# Patient Record
Sex: FEMALE | Race: WHITE
[De-identification: names, ages, dates, MRNs, and addresses within clinical notes are randomized per-mention and may not be internally consistent; named-entity substitution may affect disease eponyms.]

---

## 2019-11-15 ENCOUNTER — HOSPITAL ENCOUNTER (OUTPATIENT)
Dept: HOSPITAL 75 - RAD | Age: 29
End: 2019-11-15
Attending: OBSTETRICS & GYNECOLOGY
Payer: COMMERCIAL

## 2019-11-15 DIAGNOSIS — N93.9: ICD-10-CM

## 2019-11-15 DIAGNOSIS — N94.10: Primary | ICD-10-CM

## 2019-11-15 PROCEDURE — 76856 US EXAM PELVIC COMPLETE: CPT

## 2019-11-15 PROCEDURE — 76830 TRANSVAGINAL US NON-OB: CPT

## 2019-11-15 NOTE — DIAGNOSTIC IMAGING REPORT
PROCEDURE: US Non-ob pelvis comp/trans.



TECHNIQUE: Multiple realtime grayscale images were obtained of

the pelvis in various projections endovaginally.



Transabdominal imaging was also performed.



INDICATION: Dyspareunia.



FINDINGS: Uterus measures 7.4 x 5.8 x 3.7 cm. Endometrium is 3 mm

in thickness. No myometrial mass is detected. Right ovary

measures 4.3 x 3.0 x 2.4 cm and the left ovary measures 3.9 x 3.3

x 2.1 cm. There is blood flow to both ovaries. There are small

partially collapsed cysts involving the right ovary approximately

12 mm and 15 mm in size. No adnexal mass or free fluid is seen.



IMPRESSION: Essentially unremarkable pelvic ultrasound.



Dictated by: 



  Dictated on workstation # [id number]

## 2020-06-19 ENCOUNTER — HOSPITAL ENCOUNTER (OUTPATIENT)
Dept: HOSPITAL 75 - LAB FS | Age: 30
End: 2020-06-19
Attending: OBSTETRICS & GYNECOLOGY
Payer: COMMERCIAL

## 2020-06-19 DIAGNOSIS — R39.15: Primary | ICD-10-CM

## 2020-06-19 LAB
APTT PPP: YELLOW S
BACTERIA #/AREA URNS HPF: (no result) /HPF
BILIRUB UR QL STRIP: NEGATIVE
FIBRINOGEN PPP-MCNC: CLEAR MG/DL
GLUCOSE UR STRIP-MCNC: NEGATIVE MG/DL
KETONES UR QL STRIP: NEGATIVE
LEUKOCYTE ESTERASE UR QL STRIP: NEGATIVE
NITRITE UR QL STRIP: NEGATIVE
PH UR STRIP: 6.5 [PH] (ref 5–9)
PROT UR QL STRIP: NEGATIVE
RBC #/AREA URNS HPF: (no result) /HPF
SP GR UR STRIP: <=1.005 (ref 1.02–1.02)
SQUAMOUS #/AREA URNS HPF: (no result) /HPF
WBC #/AREA URNS HPF: (no result) /HPF

## 2020-06-19 PROCEDURE — 87077 CULTURE AEROBIC IDENTIFY: CPT

## 2020-06-19 PROCEDURE — 81000 URINALYSIS NONAUTO W/SCOPE: CPT

## 2020-06-19 PROCEDURE — 87088 URINE BACTERIA CULTURE: CPT

## 2020-07-24 ENCOUNTER — HOSPITAL ENCOUNTER (OUTPATIENT)
Dept: HOSPITAL 75 - RAD | Age: 30
End: 2020-07-24
Attending: OBSTETRICS & GYNECOLOGY
Payer: COMMERCIAL

## 2020-07-24 DIAGNOSIS — Z36.89: Primary | ICD-10-CM

## 2020-07-24 DIAGNOSIS — Z3A.21: ICD-10-CM

## 2020-07-24 PROCEDURE — 76805 OB US >/= 14 WKS SNGL FETUS: CPT

## 2020-07-24 NOTE — DIAGNOSTIC IMAGING REPORT
INDICATION: Fetal survey.



TECHNIQUE: Multiple real-time grayscale images were obtained over

the gravid uterus.



COMPARISON: None



FINDINGS: There is a single live fetus in breech presentation.

Fetal heart rate was recorded at 132 bpm. Placenta is anterior.

Amniotic fluid volume is normal. Cervical length is 3.7 cm. Fetal

survey demonstrates fetal kidneys, bladder and stomach to be

unremarkable. Fetal brain is unremarkable. There is a

four-chamber heart. There is a three-vessel cord with normal

insertion. Fetal spine is unremarkable.



Biometrical measurements are as follows:

Biparietal 4.80 cm, age 20 weeks 4 days.

Head circumference 19.54 cm, age 21 weeks 6 days.

Abdominal circumference 15.96 cm, age 21 weeks 1 days.

Femur length 3.52 cm, age 21 weeks 1 days.



Sonographic estimate age: 21 weeks 2 days.

Sonographic estimated date of delivery: 12/02/20.



Estimated Fetal Weight: 402 gm (+/- 59  gm).

LMP percentile: 60%.



Fetal heart rate: 132 beats per minute.



Fetal number: 1 of 1.



IMPRESSION: Single live IUP 21 weeks 2 days gestational age. The

estimated date of confinement sonographically is 12/02/2020.



Dictated by: 



  Dictated on workstation # [id number]

## 2020-09-30 ENCOUNTER — HOSPITAL ENCOUNTER (OUTPATIENT)
Dept: HOSPITAL 75 - LABNPT | Age: 30
End: 2020-09-30
Attending: OBSTETRICS & GYNECOLOGY
Payer: COMMERCIAL

## 2020-09-30 DIAGNOSIS — O13.9: Primary | ICD-10-CM

## 2020-09-30 LAB
CREAT UR-MCNC: 58 MG/DL (ref 30–125)
PROT UR-MCNC: < 6 MG/DL (ref 6–12)

## 2020-09-30 PROCEDURE — 82570 ASSAY OF URINE CREATININE: CPT

## 2020-09-30 PROCEDURE — 84156 ASSAY OF PROTEIN URINE: CPT

## 2020-11-05 ENCOUNTER — HOSPITAL ENCOUNTER (OUTPATIENT)
Dept: HOSPITAL 75 - LABNPT | Age: 30
End: 2020-11-05
Attending: OBSTETRICS & GYNECOLOGY
Payer: COMMERCIAL

## 2020-11-05 LAB
CREAT UR-MCNC: 53 MG/DL (ref 30–125)
PROT UR-MCNC: < 6 MG/DL (ref 6–12)

## 2020-11-05 PROCEDURE — 82570 ASSAY OF URINE CREATININE: CPT

## 2020-11-05 PROCEDURE — 84156 ASSAY OF PROTEIN URINE: CPT

## 2020-11-06 ENCOUNTER — HOSPITAL ENCOUNTER (OUTPATIENT)
Dept: HOSPITAL 75 - WSO | Age: 30
LOS: 1 days | Discharge: HOME | End: 2020-11-07
Attending: OBSTETRICS & GYNECOLOGY
Payer: COMMERCIAL

## 2020-11-06 VITALS — DIASTOLIC BLOOD PRESSURE: 83 MMHG | SYSTOLIC BLOOD PRESSURE: 148 MMHG

## 2020-11-06 VITALS — SYSTOLIC BLOOD PRESSURE: 161 MMHG | DIASTOLIC BLOOD PRESSURE: 95 MMHG

## 2020-11-06 VITALS — DIASTOLIC BLOOD PRESSURE: 91 MMHG | SYSTOLIC BLOOD PRESSURE: 164 MMHG

## 2020-11-06 VITALS — DIASTOLIC BLOOD PRESSURE: 83 MMHG | SYSTOLIC BLOOD PRESSURE: 139 MMHG

## 2020-11-06 VITALS — SYSTOLIC BLOOD PRESSURE: 174 MMHG | DIASTOLIC BLOOD PRESSURE: 98 MMHG

## 2020-11-06 VITALS — BODY MASS INDEX: 43.4 KG/M2 | HEIGHT: 69.02 IN | WEIGHT: 293 LBS

## 2020-11-06 DIAGNOSIS — Z34.90: Primary | ICD-10-CM

## 2020-11-06 DIAGNOSIS — Z3A.00: ICD-10-CM

## 2020-11-06 LAB
ALBUMIN SERPL-MCNC: 3.1 GM/DL (ref 3.2–4.5)
ALP SERPL-CCNC: 81 U/L (ref 40–136)
ALT SERPL-CCNC: 25 U/L (ref 0–55)
APTT PPP: YELLOW S
BACTERIA #/AREA URNS HPF: (no result) /HPF
BASOPHILS # BLD AUTO: 0 10^3/UL (ref 0–0.1)
BASOPHILS NFR BLD AUTO: 0 % (ref 0–10)
BILIRUB SERPL-MCNC: 0.3 MG/DL (ref 0.1–1)
BILIRUB UR QL STRIP: NEGATIVE
BUN/CREAT SERPL: 13
CALCIUM SERPL-MCNC: 8.6 MG/DL (ref 8.5–10.1)
CHLORIDE SERPL-SCNC: 108 MMOL/L (ref 98–107)
CO2 SERPL-SCNC: 19 MMOL/L (ref 21–32)
CREAT SERPL-MCNC: 0.72 MG/DL (ref 0.6–1.3)
EOSINOPHIL # BLD AUTO: 0 10^3/UL (ref 0–0.3)
EOSINOPHIL NFR BLD AUTO: 0 % (ref 0–10)
FIBRINOGEN PPP-MCNC: CLEAR MG/DL
GFR SERPLBLD BASED ON 1.73 SQ M-ARVRAT: > 60 ML/MIN
GLUCOSE SERPL-MCNC: 86 MG/DL (ref 70–105)
GLUCOSE UR STRIP-MCNC: NEGATIVE MG/DL
HCT VFR BLD CALC: 35 % (ref 35–52)
HGB BLD-MCNC: 11.9 G/DL (ref 11.5–16)
KETONES UR QL STRIP: NEGATIVE
LEUKOCYTE ESTERASE UR QL STRIP: NEGATIVE
LYMPHOCYTES # BLD AUTO: 1.9 10^3/UL (ref 1–4)
LYMPHOCYTES NFR BLD AUTO: 22 % (ref 12–44)
MANUAL DIFFERENTIAL PERFORMED BLD QL: NO
MCH RBC QN AUTO: 29 PG (ref 25–34)
MCHC RBC AUTO-ENTMCNC: 34 G/DL (ref 32–36)
MCV RBC AUTO: 86 FL (ref 80–99)
MONOCYTES # BLD AUTO: 0.6 10^3/UL (ref 0–1)
MONOCYTES NFR BLD AUTO: 7 % (ref 0–12)
NEUTROPHILS # BLD AUTO: 6 10^3/UL (ref 1.8–7.8)
NEUTROPHILS NFR BLD AUTO: 71 % (ref 42–75)
NITRITE UR QL STRIP: NEGATIVE
PH UR STRIP: 6 [PH] (ref 5–9)
PLATELET # BLD: 173 10^3/UL (ref 130–400)
PMV BLD AUTO: 11.6 FL (ref 9–12.2)
POTASSIUM SERPL-SCNC: 3.6 MMOL/L (ref 3.6–5)
PROT SERPL-MCNC: 6.1 GM/DL (ref 6.4–8.2)
PROT UR QL STRIP: NEGATIVE
RBC #/AREA URNS HPF: (no result) /HPF
SODIUM SERPL-SCNC: 138 MMOL/L (ref 135–145)
SP GR UR STRIP: 1.01 (ref 1.02–1.02)
SQUAMOUS #/AREA URNS HPF: (no result) /HPF
URATE SERPL-MCNC: 6.7 MG/DL (ref 2.6–7.2)
WBC # BLD AUTO: 8.5 10^3/UL (ref 4.3–11)
WBC #/AREA URNS HPF: (no result) /HPF

## 2020-11-06 PROCEDURE — 36415 COLL VENOUS BLD VENIPUNCTURE: CPT

## 2020-11-06 PROCEDURE — 82570 ASSAY OF URINE CREATININE: CPT

## 2020-11-06 PROCEDURE — 84550 ASSAY OF BLOOD/URIC ACID: CPT

## 2020-11-06 PROCEDURE — 99213 OFFICE O/P EST LOW 20 MIN: CPT

## 2020-11-06 PROCEDURE — 81000 URINALYSIS NONAUTO W/SCOPE: CPT

## 2020-11-06 PROCEDURE — 87088 URINE BACTERIA CULTURE: CPT

## 2020-11-06 PROCEDURE — 85025 COMPLETE CBC W/AUTO DIFF WBC: CPT

## 2020-11-06 PROCEDURE — 80053 COMPREHEN METABOLIC PANEL: CPT

## 2020-11-06 PROCEDURE — 84156 ASSAY OF PROTEIN URINE: CPT

## 2020-11-06 PROCEDURE — 83615 LACTATE (LD) (LDH) ENZYME: CPT

## 2020-11-06 NOTE — NUR
called unit, requesting bp be taken and dr updated. 

2207:  notified of 164/91 bp and pulse 64

plan for pt to stay all night, regular diet and lab work.

## 2020-11-06 NOTE — NUR
PREET ESCOBAR presented to unit via AMBULATORY from ED, accompanied by ADULT MALE, with c/o 
INCREASED BP. PREET ESCOBAR weighed, gowned, voided, and to bed.  EFHM and TOCO applied, VS 
taken.  PREET ESCOBAR oriented to bed controls, call light, TV, heat, and A/C controls. 
above  and further assessments carried out per CATRACHITO POMPA

## 2020-11-06 NOTE — NUR
Dr. Velasquez called about pt. Informed that  pt presents because of elevated blood 
pressures. Home and current vitals and B/P's reviewed. Informed that pt has no other 
symptoms of elevated B/P's. Informed that pt was seen in the office yesterday and Dr. Langston 
ran pre-eclamptic panal and urine, and pt states that "nurse called and said that all labs 
were good." Dr. Langston placed pt on labetalol 200mg TID. Dr. Velasquez orders pt to have evening 
dose of Labetalol and to check B/P approx 30 minutes after dose. Continue to watch pt at 
this time.

## 2020-11-07 VITALS — DIASTOLIC BLOOD PRESSURE: 89 MMHG | SYSTOLIC BLOOD PRESSURE: 132 MMHG

## 2020-11-07 VITALS — SYSTOLIC BLOOD PRESSURE: 174 MMHG | DIASTOLIC BLOOD PRESSURE: 105 MMHG

## 2020-11-07 VITALS — SYSTOLIC BLOOD PRESSURE: 135 MMHG | DIASTOLIC BLOOD PRESSURE: 72 MMHG

## 2020-11-07 VITALS — DIASTOLIC BLOOD PRESSURE: 104 MMHG | SYSTOLIC BLOOD PRESSURE: 166 MMHG

## 2020-11-07 VITALS — SYSTOLIC BLOOD PRESSURE: 113 MMHG | DIASTOLIC BLOOD PRESSURE: 75 MMHG

## 2020-11-07 VITALS — SYSTOLIC BLOOD PRESSURE: 147 MMHG | DIASTOLIC BLOOD PRESSURE: 87 MMHG

## 2020-11-07 VITALS — SYSTOLIC BLOOD PRESSURE: 121 MMHG | DIASTOLIC BLOOD PRESSURE: 69 MMHG

## 2020-11-07 VITALS — DIASTOLIC BLOOD PRESSURE: 88 MMHG | SYSTOLIC BLOOD PRESSURE: 130 MMHG

## 2020-11-07 VITALS — DIASTOLIC BLOOD PRESSURE: 105 MMHG | SYSTOLIC BLOOD PRESSURE: 174 MMHG

## 2020-11-07 LAB
CREAT UR-MCNC: 191 MG/DL (ref 30–125)
PROT UR-MCNC: 18 MG/DL (ref 6–12)

## 2020-11-07 NOTE — NUR
Discharge instructions explained, signed and copy to patient.  pt verbalized understanding 
of instructions and questions answered.  Pt up to get dressed.

## 2020-11-07 NOTE — NUR
Dr. Velasquez called with updated B/P's throughout the night and protein creatinine ratio.  
states that she will contact Dr. Langston for further management and that pt. needs to start 
labetalol 200mg BID while she is here.

## 2020-11-07 NOTE — NUR
Dr. Velasquez called and states that pt can be discharged on bedrest. Pt. should keep her 
appointment with Dr. Langston this week and continue medications.

## 2020-11-09 NOTE — PHYSICIAN QUERY-FINAL DX
Clinic Account Progress/Dx


Physician Query:


Please give diagnosis





Please include # weeks gestation


Date of Service





Nov 6, 2020 at 19:36











NEENA STERLING                     Nov 9, 2020 09:19

## 2020-11-15 ENCOUNTER — HOSPITAL ENCOUNTER (INPATIENT)
Dept: HOSPITAL 75 - LDRP | Age: 30
LOS: 3 days | Discharge: HOME | End: 2020-11-18
Attending: OBSTETRICS & GYNECOLOGY | Admitting: OBSTETRICS & GYNECOLOGY
Payer: COMMERCIAL

## 2020-11-15 VITALS — BODY MASS INDEX: 43.4 KG/M2 | HEIGHT: 69.02 IN | WEIGHT: 293 LBS

## 2020-11-15 VITALS — SYSTOLIC BLOOD PRESSURE: 162 MMHG | DIASTOLIC BLOOD PRESSURE: 101 MMHG

## 2020-11-15 VITALS — DIASTOLIC BLOOD PRESSURE: 128 MMHG | SYSTOLIC BLOOD PRESSURE: 182 MMHG

## 2020-11-15 VITALS — SYSTOLIC BLOOD PRESSURE: 160 MMHG | DIASTOLIC BLOOD PRESSURE: 93 MMHG

## 2020-11-15 VITALS — SYSTOLIC BLOOD PRESSURE: 152 MMHG | DIASTOLIC BLOOD PRESSURE: 101 MMHG

## 2020-11-15 VITALS — DIASTOLIC BLOOD PRESSURE: 101 MMHG | SYSTOLIC BLOOD PRESSURE: 173 MMHG

## 2020-11-15 VITALS — SYSTOLIC BLOOD PRESSURE: 155 MMHG | DIASTOLIC BLOOD PRESSURE: 102 MMHG

## 2020-11-15 VITALS — SYSTOLIC BLOOD PRESSURE: 182 MMHG | DIASTOLIC BLOOD PRESSURE: 116 MMHG

## 2020-11-15 VITALS — SYSTOLIC BLOOD PRESSURE: 177 MMHG | DIASTOLIC BLOOD PRESSURE: 109 MMHG

## 2020-11-15 DIAGNOSIS — Z20.828: ICD-10-CM

## 2020-11-15 DIAGNOSIS — D62: ICD-10-CM

## 2020-11-15 DIAGNOSIS — Z3A.37: ICD-10-CM

## 2020-11-15 LAB
ALBUMIN SERPL-MCNC: 3.3 GM/DL (ref 3.2–4.5)
ALP SERPL-CCNC: 94 U/L (ref 40–136)
ALT SERPL-CCNC: 20 U/L (ref 0–55)
APTT PPP: YELLOW S
BACTERIA #/AREA URNS HPF: (no result) /HPF
BASOPHILS # BLD AUTO: 0 10^3/UL (ref 0–0.1)
BASOPHILS NFR BLD AUTO: 0 % (ref 0–10)
BILIRUB SERPL-MCNC: 0.3 MG/DL (ref 0.1–1)
BILIRUB UR QL STRIP: NEGATIVE
BUN/CREAT SERPL: 13
CALCIUM SERPL-MCNC: 8.5 MG/DL (ref 8.5–10.1)
CHLORIDE SERPL-SCNC: 107 MMOL/L (ref 98–107)
CO2 SERPL-SCNC: 18 MMOL/L (ref 21–32)
CREAT SERPL-MCNC: 0.85 MG/DL (ref 0.6–1.3)
CREAT UR-MCNC: 220 MG/DL (ref 30–125)
EOSINOPHIL # BLD AUTO: 0 10^3/UL (ref 0–0.3)
EOSINOPHIL NFR BLD AUTO: 0 % (ref 0–10)
FIBRINOGEN PPP-MCNC: CLEAR MG/DL
GFR SERPLBLD BASED ON 1.73 SQ M-ARVRAT: > 60 ML/MIN
GLUCOSE SERPL-MCNC: 114 MG/DL (ref 70–105)
GLUCOSE UR STRIP-MCNC: NEGATIVE MG/DL
HCT VFR BLD CALC: 37 % (ref 35–52)
HGB BLD-MCNC: 12.8 G/DL (ref 11.5–16)
KETONES UR QL STRIP: NEGATIVE
LEUKOCYTE ESTERASE UR QL STRIP: NEGATIVE
LYMPHOCYTES # BLD AUTO: 1.6 10^3/UL (ref 1–4)
LYMPHOCYTES NFR BLD AUTO: 18 % (ref 12–44)
MANUAL DIFFERENTIAL PERFORMED BLD QL: NO
MCH RBC QN AUTO: 29 PG (ref 25–34)
MCHC RBC AUTO-ENTMCNC: 34 G/DL (ref 32–36)
MCV RBC AUTO: 85 FL (ref 80–99)
MONOCYTES # BLD AUTO: 0.6 10^3/UL (ref 0–1)
MONOCYTES NFR BLD AUTO: 6 % (ref 0–12)
NEUTROPHILS # BLD AUTO: 7 10^3/UL (ref 1.8–7.8)
NEUTROPHILS NFR BLD AUTO: 76 % (ref 42–75)
NITRITE UR QL STRIP: NEGATIVE
PH UR STRIP: 6 [PH] (ref 5–9)
PLATELET # BLD: 191 10^3/UL (ref 130–400)
PMV BLD AUTO: 12 FL (ref 9–12.2)
POTASSIUM SERPL-SCNC: 3.5 MMOL/L (ref 3.6–5)
PROT SERPL-MCNC: 6.6 GM/DL (ref 6.4–8.2)
PROT UR QL STRIP: (no result)
PROT UR-MCNC: 145 MG/DL (ref 6–12)
RBC #/AREA URNS HPF: (no result) /HPF
SODIUM SERPL-SCNC: 138 MMOL/L (ref 135–145)
SP GR UR STRIP: >=1.03 (ref 1.02–1.02)
SQUAMOUS #/AREA URNS HPF: (no result) /HPF
URATE SERPL-MCNC: 6.8 MG/DL (ref 2.6–7.2)
WBC # BLD AUTO: 9.3 10^3/UL (ref 4.3–11)
WBC #/AREA URNS HPF: (no result) /HPF

## 2020-11-15 PROCEDURE — 80053 COMPREHEN METABOLIC PANEL: CPT

## 2020-11-15 PROCEDURE — 86900 BLOOD TYPING SEROLOGIC ABO: CPT

## 2020-11-15 PROCEDURE — 82570 ASSAY OF URINE CREATININE: CPT

## 2020-11-15 PROCEDURE — 84156 ASSAY OF PROTEIN URINE: CPT

## 2020-11-15 PROCEDURE — 86901 BLOOD TYPING SEROLOGIC RH(D): CPT

## 2020-11-15 PROCEDURE — 87635 SARS-COV-2 COVID-19 AMP PRB: CPT

## 2020-11-15 PROCEDURE — 84550 ASSAY OF BLOOD/URIC ACID: CPT

## 2020-11-15 PROCEDURE — 36415 COLL VENOUS BLD VENIPUNCTURE: CPT

## 2020-11-15 PROCEDURE — 3E0DXGC INTRODUCTION OF OTHER THERAPEUTIC SUBSTANCE INTO MOUTH AND PHARYNX, EXTERNAL APPROACH: ICD-10-PCS | Performed by: OBSTETRICS & GYNECOLOGY

## 2020-11-15 PROCEDURE — 85025 COMPLETE CBC W/AUTO DIFF WBC: CPT

## 2020-11-15 PROCEDURE — 87088 URINE BACTERIA CULTURE: CPT

## 2020-11-15 PROCEDURE — 86850 RBC ANTIBODY SCREEN: CPT

## 2020-11-15 PROCEDURE — 81000 URINALYSIS NONAUTO W/SCOPE: CPT

## 2020-11-15 PROCEDURE — 83735 ASSAY OF MAGNESIUM: CPT

## 2020-11-15 RX ADMIN — LABETALOL HCL SCH MG: 200 TABLET, FILM COATED ORAL at 20:09

## 2020-11-15 RX ADMIN — SODIUM CHLORIDE, SODIUM LACTATE, POTASSIUM CHLORIDE, CALCIUM CHLORIDE, AND DEXTROSE MONOHYDRATE SCH MLS/HR: 600; 310; 30; 20; 5 INJECTION, SOLUTION INTRAVENOUS at 20:23

## 2020-11-15 NOTE — NUR
PREET ESCOBAR presented to unit via ambulation from ED, accompanied by ,for scheduled 
IOL. Pt. weighed, gowned, voided, and to bed.  EFHM and TOCO applied, VS taken.  Pt. 
oriented to bed controls, call light, TV, heat, and A/C controls.

## 2020-11-16 VITALS — SYSTOLIC BLOOD PRESSURE: 159 MMHG | DIASTOLIC BLOOD PRESSURE: 104 MMHG

## 2020-11-16 VITALS — SYSTOLIC BLOOD PRESSURE: 188 MMHG | DIASTOLIC BLOOD PRESSURE: 112 MMHG

## 2020-11-16 VITALS — DIASTOLIC BLOOD PRESSURE: 103 MMHG | SYSTOLIC BLOOD PRESSURE: 165 MMHG

## 2020-11-16 VITALS — DIASTOLIC BLOOD PRESSURE: 105 MMHG | SYSTOLIC BLOOD PRESSURE: 174 MMHG

## 2020-11-16 VITALS — DIASTOLIC BLOOD PRESSURE: 83 MMHG | SYSTOLIC BLOOD PRESSURE: 133 MMHG

## 2020-11-16 VITALS — SYSTOLIC BLOOD PRESSURE: 156 MMHG | DIASTOLIC BLOOD PRESSURE: 100 MMHG

## 2020-11-16 VITALS — SYSTOLIC BLOOD PRESSURE: 163 MMHG | DIASTOLIC BLOOD PRESSURE: 100 MMHG

## 2020-11-16 VITALS — SYSTOLIC BLOOD PRESSURE: 192 MMHG | DIASTOLIC BLOOD PRESSURE: 106 MMHG

## 2020-11-16 VITALS — SYSTOLIC BLOOD PRESSURE: 135 MMHG | DIASTOLIC BLOOD PRESSURE: 92 MMHG

## 2020-11-16 VITALS — DIASTOLIC BLOOD PRESSURE: 92 MMHG | SYSTOLIC BLOOD PRESSURE: 144 MMHG

## 2020-11-16 VITALS — SYSTOLIC BLOOD PRESSURE: 174 MMHG | DIASTOLIC BLOOD PRESSURE: 104 MMHG

## 2020-11-16 VITALS — SYSTOLIC BLOOD PRESSURE: 139 MMHG | DIASTOLIC BLOOD PRESSURE: 95 MMHG

## 2020-11-16 VITALS — SYSTOLIC BLOOD PRESSURE: 127 MMHG | DIASTOLIC BLOOD PRESSURE: 81 MMHG

## 2020-11-16 VITALS — DIASTOLIC BLOOD PRESSURE: 75 MMHG | SYSTOLIC BLOOD PRESSURE: 152 MMHG

## 2020-11-16 VITALS — DIASTOLIC BLOOD PRESSURE: 101 MMHG | SYSTOLIC BLOOD PRESSURE: 138 MMHG

## 2020-11-16 VITALS — DIASTOLIC BLOOD PRESSURE: 90 MMHG | SYSTOLIC BLOOD PRESSURE: 144 MMHG

## 2020-11-16 VITALS — DIASTOLIC BLOOD PRESSURE: 57 MMHG | SYSTOLIC BLOOD PRESSURE: 117 MMHG

## 2020-11-16 VITALS — SYSTOLIC BLOOD PRESSURE: 140 MMHG | DIASTOLIC BLOOD PRESSURE: 84 MMHG

## 2020-11-16 VITALS — SYSTOLIC BLOOD PRESSURE: 181 MMHG | DIASTOLIC BLOOD PRESSURE: 87 MMHG

## 2020-11-16 VITALS — DIASTOLIC BLOOD PRESSURE: 108 MMHG | SYSTOLIC BLOOD PRESSURE: 176 MMHG

## 2020-11-16 VITALS — DIASTOLIC BLOOD PRESSURE: 107 MMHG | SYSTOLIC BLOOD PRESSURE: 188 MMHG

## 2020-11-16 VITALS — SYSTOLIC BLOOD PRESSURE: 143 MMHG | DIASTOLIC BLOOD PRESSURE: 84 MMHG

## 2020-11-16 VITALS — SYSTOLIC BLOOD PRESSURE: 168 MMHG | DIASTOLIC BLOOD PRESSURE: 106 MMHG

## 2020-11-16 VITALS — SYSTOLIC BLOOD PRESSURE: 165 MMHG | DIASTOLIC BLOOD PRESSURE: 115 MMHG

## 2020-11-16 VITALS — DIASTOLIC BLOOD PRESSURE: 100 MMHG | SYSTOLIC BLOOD PRESSURE: 149 MMHG

## 2020-11-16 VITALS — SYSTOLIC BLOOD PRESSURE: 165 MMHG | DIASTOLIC BLOOD PRESSURE: 114 MMHG

## 2020-11-16 VITALS — SYSTOLIC BLOOD PRESSURE: 160 MMHG | DIASTOLIC BLOOD PRESSURE: 106 MMHG

## 2020-11-16 VITALS — SYSTOLIC BLOOD PRESSURE: 153 MMHG | DIASTOLIC BLOOD PRESSURE: 106 MMHG

## 2020-11-16 VITALS — DIASTOLIC BLOOD PRESSURE: 95 MMHG | SYSTOLIC BLOOD PRESSURE: 158 MMHG

## 2020-11-16 VITALS — DIASTOLIC BLOOD PRESSURE: 111 MMHG | SYSTOLIC BLOOD PRESSURE: 169 MMHG

## 2020-11-16 VITALS — DIASTOLIC BLOOD PRESSURE: 98 MMHG | SYSTOLIC BLOOD PRESSURE: 144 MMHG

## 2020-11-16 VITALS — SYSTOLIC BLOOD PRESSURE: 146 MMHG | DIASTOLIC BLOOD PRESSURE: 84 MMHG

## 2020-11-16 VITALS — SYSTOLIC BLOOD PRESSURE: 169 MMHG | DIASTOLIC BLOOD PRESSURE: 111 MMHG

## 2020-11-16 VITALS — DIASTOLIC BLOOD PRESSURE: 95 MMHG | SYSTOLIC BLOOD PRESSURE: 169 MMHG

## 2020-11-16 VITALS — DIASTOLIC BLOOD PRESSURE: 73 MMHG | SYSTOLIC BLOOD PRESSURE: 122 MMHG

## 2020-11-16 VITALS — DIASTOLIC BLOOD PRESSURE: 93 MMHG | SYSTOLIC BLOOD PRESSURE: 142 MMHG

## 2020-11-16 VITALS — DIASTOLIC BLOOD PRESSURE: 101 MMHG | SYSTOLIC BLOOD PRESSURE: 186 MMHG

## 2020-11-16 VITALS — DIASTOLIC BLOOD PRESSURE: 83 MMHG | SYSTOLIC BLOOD PRESSURE: 134 MMHG

## 2020-11-16 VITALS — DIASTOLIC BLOOD PRESSURE: 104 MMHG | SYSTOLIC BLOOD PRESSURE: 153 MMHG

## 2020-11-16 VITALS — SYSTOLIC BLOOD PRESSURE: 227 MMHG | DIASTOLIC BLOOD PRESSURE: 112 MMHG

## 2020-11-16 VITALS — SYSTOLIC BLOOD PRESSURE: 144 MMHG | DIASTOLIC BLOOD PRESSURE: 87 MMHG

## 2020-11-16 VITALS — DIASTOLIC BLOOD PRESSURE: 102 MMHG | SYSTOLIC BLOOD PRESSURE: 158 MMHG

## 2020-11-16 VITALS — SYSTOLIC BLOOD PRESSURE: 159 MMHG | DIASTOLIC BLOOD PRESSURE: 91 MMHG

## 2020-11-16 VITALS — DIASTOLIC BLOOD PRESSURE: 99 MMHG | SYSTOLIC BLOOD PRESSURE: 156 MMHG

## 2020-11-16 VITALS — SYSTOLIC BLOOD PRESSURE: 132 MMHG | DIASTOLIC BLOOD PRESSURE: 91 MMHG

## 2020-11-16 VITALS — DIASTOLIC BLOOD PRESSURE: 81 MMHG | SYSTOLIC BLOOD PRESSURE: 128 MMHG

## 2020-11-16 VITALS — SYSTOLIC BLOOD PRESSURE: 176 MMHG | DIASTOLIC BLOOD PRESSURE: 108 MMHG

## 2020-11-16 VITALS — DIASTOLIC BLOOD PRESSURE: 95 MMHG | SYSTOLIC BLOOD PRESSURE: 179 MMHG

## 2020-11-16 VITALS — DIASTOLIC BLOOD PRESSURE: 107 MMHG | SYSTOLIC BLOOD PRESSURE: 168 MMHG

## 2020-11-16 VITALS — SYSTOLIC BLOOD PRESSURE: 178 MMHG | DIASTOLIC BLOOD PRESSURE: 114 MMHG

## 2020-11-16 VITALS — SYSTOLIC BLOOD PRESSURE: 148 MMHG | DIASTOLIC BLOOD PRESSURE: 95 MMHG

## 2020-11-16 VITALS — SYSTOLIC BLOOD PRESSURE: 143 MMHG | DIASTOLIC BLOOD PRESSURE: 87 MMHG

## 2020-11-16 VITALS — DIASTOLIC BLOOD PRESSURE: 99 MMHG | SYSTOLIC BLOOD PRESSURE: 147 MMHG

## 2020-11-16 VITALS — SYSTOLIC BLOOD PRESSURE: 164 MMHG | DIASTOLIC BLOOD PRESSURE: 89 MMHG

## 2020-11-16 VITALS — SYSTOLIC BLOOD PRESSURE: 129 MMHG | DIASTOLIC BLOOD PRESSURE: 94 MMHG

## 2020-11-16 VITALS — SYSTOLIC BLOOD PRESSURE: 168 MMHG | DIASTOLIC BLOOD PRESSURE: 107 MMHG

## 2020-11-16 VITALS — DIASTOLIC BLOOD PRESSURE: 79 MMHG | SYSTOLIC BLOOD PRESSURE: 143 MMHG

## 2020-11-16 VITALS — DIASTOLIC BLOOD PRESSURE: 89 MMHG | SYSTOLIC BLOOD PRESSURE: 136 MMHG

## 2020-11-16 VITALS — SYSTOLIC BLOOD PRESSURE: 150 MMHG | DIASTOLIC BLOOD PRESSURE: 92 MMHG

## 2020-11-16 VITALS — DIASTOLIC BLOOD PRESSURE: 96 MMHG | SYSTOLIC BLOOD PRESSURE: 182 MMHG

## 2020-11-16 RX ADMIN — Medication SCH ML: at 21:17

## 2020-11-16 RX ADMIN — MISOPROSTOL SCH MCG: 100 TABLET ORAL at 19:23

## 2020-11-16 RX ADMIN — SODIUM CHLORIDE, SODIUM LACTATE, POTASSIUM CHLORIDE, CALCIUM CHLORIDE, AND DEXTROSE MONOHYDRATE SCH MLS/HR: 600; 310; 30; 20; 5 INJECTION, SOLUTION INTRAVENOUS at 04:19

## 2020-11-16 RX ADMIN — MAGNESIUM SULFATE IN WATER SCH MLS/HR: 40 INJECTION, SOLUTION INTRAVENOUS at 18:56

## 2020-11-16 RX ADMIN — WATER SCH MLS/HR: 1 INJECTION INTRAMUSCULAR; INTRAVENOUS; SUBCUTANEOUS at 04:18

## 2020-11-16 RX ADMIN — WATER SCH MLS/HR: 1 INJECTION INTRAMUSCULAR; INTRAVENOUS; SUBCUTANEOUS at 08:38

## 2020-11-16 RX ADMIN — LABETALOL HCL SCH MG: 200 TABLET, FILM COATED ORAL at 06:38

## 2020-11-16 RX ADMIN — MISOPROSTOL SCH MCG: 100 TABLET ORAL at 00:03

## 2020-11-16 RX ADMIN — SODIUM CHLORIDE, SODIUM LACTATE, POTASSIUM CHLORIDE, CALCIUM CHLORIDE, AND DEXTROSE MONOHYDRATE SCH MLS/HR: 600; 310; 30; 20; 5 INJECTION, SOLUTION INTRAVENOUS at 20:29

## 2020-11-16 RX ADMIN — SODIUM CHLORIDE, SODIUM LACTATE, POTASSIUM CHLORIDE, CALCIUM CHLORIDE, AND DEXTROSE MONOHYDRATE SCH MLS/HR: 600; 310; 30; 20; 5 INJECTION, SOLUTION INTRAVENOUS at 20:28

## 2020-11-16 RX ADMIN — MAGNESIUM SULFATE IN WATER SCH MLS/HR: 40 INJECTION, SOLUTION INTRAVENOUS at 00:13

## 2020-11-16 RX ADMIN — LABETALOL HCL SCH MG: 200 TABLET, FILM COATED ORAL at 13:27

## 2020-11-16 RX ADMIN — MAGNESIUM SULFATE IN WATER SCH MLS/HR: 40 INJECTION, SOLUTION INTRAVENOUS at 09:15

## 2020-11-16 RX ADMIN — LABETALOL HCL SCH MG: 200 TABLET, FILM COATED ORAL at 21:10

## 2020-11-16 RX ADMIN — ONDANSETRON PRN MG: 2 INJECTION, SOLUTION INTRAMUSCULAR; INTRAVENOUS at 13:03

## 2020-11-16 RX ADMIN — IBUPROFEN SCH MG: 600 TABLET ORAL at 20:12

## 2020-11-16 RX ADMIN — DOCUSATE SODIUM SCH MG: 100 CAPSULE ORAL at 20:12

## 2020-11-16 RX ADMIN — LABETALOL HCL SCH MG: 200 TABLET, FILM COATED ORAL at 19:43

## 2020-11-16 RX ADMIN — ONDANSETRON PRN MG: 2 INJECTION, SOLUTION INTRAMUSCULAR; INTRAVENOUS at 08:36

## 2020-11-16 RX ADMIN — SODIUM CHLORIDE, SODIUM LACTATE, POTASSIUM CHLORIDE, CALCIUM CHLORIDE, AND DEXTROSE MONOHYDRATE SCH MLS/HR: 600; 310; 30; 20; 5 INJECTION, SOLUTION INTRAVENOUS at 18:56

## 2020-11-16 RX ADMIN — Medication SCH ML: at 15:31

## 2020-11-16 NOTE — HISTORY & PHYSICAL-OB
OB - Chief Complaint & HPI


Date/Time


Date of Admission:


Date of Admission:  Nov 15, 2020 at 18:29


Date seen by a Provider:  2020


Time Seen by a Provider:  10:25





Chief Complaint/History


OB-Reason for Admission/Chief:  Induction of Labor


Hx :  1


Hx Para:  0


Expected Date of Delivery:  Dec 5, 2020


Gestational Age in Weeks:  37


Gestational Age in Days:  2


Indication for induction:  medical complication


Other reason for admission:


Preeclampsia


Admission Nurse Assessment Rev:  Yes


History of Labs


GBS pos


B neg





Allergies and Home Medications


Allergies


Coded Allergies:  


     No Known Drug Allergies (Unverified , 20)





Home Medications


Labetalol HCl 200 Mg Tablet, 200 MG PO TID, (Reported)


Levothyroxine Sodium 25 Mcg Tablet, 25 MCG PO DAILY, (Reported)





Patient Home Medication List


Home Medication List Reviewed:  Yes





OB - History


Hx of Present Pregnancy


Prenatal Care:  Yes


Ultrasounds:  Normal mid trimester US


Obstetrical Complications:  Pre-eclampsia


Medical Complications:  None





Delivery History


Adverse Rxn to Tranfusion:  No





Patient Past Medical History





n/a





Social History/Family History


Recent Infectious Disease Expo:  No


Alcohol Use:  Denies Use


Recreational Drug Use:  Yes


2nd Hand Smoke Exposure:  No





OB - Admission Exam


Physical Exam


Vitals:





Vital Signs








 20





 02:35 05:35 07:05


 


Temp  36.5 


 


Pulse   85


 


Resp   18


 


B/P (MAP)   165/114 (131)


 


Pulse Ox   98


 


O2 Delivery   Room Air


 


O2 Flow Rate 15.00  








HEENT:  NCAT


Heart:  Rhythm Normal


Lungs:  Clear


Abdomen:  Gravid


Extremities:  Normal


Reflexes:  Normal


Cervical Dilatation:  2cm


Effacement:  75%


Station:  -1


Membranes:  Intact


Fetal Heart Rate:  130's


Accelerations:  Accelerations Present


Decelerations:  No Decelerations


Short Term Variability:  Present


Long Term Variability:  Average (6-25)


Contractions on Admission:  6-10 Minutes Apart


Intensity:  Mild


Labs





Laboratory Tests








Test


 11/15/20


19:30 11/15/20


20:10 Range/Units


 


 


White Blood Count


 9.3 


 


 4.3-11.0


10^3/uL


 


Red Blood Count


 4.39 


 


 3.80-5.11


10^6/uL


 


Hemoglobin 12.8   11.5-16.0  g/dL


 


Hematocrit 37   35-52  %


 


Mean Corpuscular Volume 85   80-99  fL


 


Mean Corpuscular Hemoglobin 29   25-34  pg


 


Mean Corpuscular Hemoglobin


Concent 34 


 


 32-36  g/dL





 


Red Cell Distribution Width 13.7   10.0-14.5  %


 


Platelet Count


 191 


 


 130-400


10^3/uL


 


Mean Platelet Volume 12.0   9.0-12.2  fL


 


Immature Granulocyte % (Auto) 0    %


 


Neutrophils (%) (Auto) 76 H  42-75  %


 


Lymphocytes (%) (Auto) 18   12-44  %


 


Monocytes (%) (Auto) 6   0-12  %


 


Eosinophils (%) (Auto) 0   0-10  %


 


Basophils (%) (Auto) 0   0-10  %


 


Neutrophils # (Auto)


 7.0 


 


 1.8-7.8


10^3/uL


 


Lymphocytes # (Auto)


 1.6 


 


 1.0-4.0


10^3/uL


 


Monocytes # (Auto)


 0.6 


 


 0.0-1.0


10^3/uL


 


Eosinophils # (Auto)


 0.0 


 


 0.0-0.3


10^3/uL


 


Basophils # (Auto)


 0.0 


 


 0.0-0.1


10^3/uL


 


Immature Granulocyte # (Auto)


 0.0 


 


 0.0-0.1


10^3/uL


 


Urine Color YELLOW    


 


Urine Clarity CLEAR    


 


Urine pH 6.0   5-9  


 


Urine Specific Gravity >=1.030   1.016-1.022  


 


Urine Protein 145 H  6-12  MG/DL


 


Urine Glucose (UA) NEGATIVE   NEGATIVE  


 


Urine Ketones NEGATIVE   NEGATIVE  


 


Urine Nitrite NEGATIVE   NEGATIVE  


 


Urine Bilirubin NEGATIVE   NEGATIVE  


 


Urine Urobilinogen 0.2   < = 1.0  MG/DL


 


Urine Leukocyte Esterase NEGATIVE   NEGATIVE  


 


Urine RBC (Auto) TRACE-L   NEGATIVE  


 


Urine RBC RARE    /HPF


 


Urine WBC 0-2    /HPF


 


Urine Squamous Epithelial


Cells 5-10 


 


  /HPF





 


Urine Crystals NONE    /LPF


 


Urine Bacteria MODERATE H   /HPF


 


Urine Casts NONE    /LPF


 


Urine Mucus SMALL H   /LPF


 


Urine Culture Indicated YES    


 


Urine Creatinine 220 H    MG/DL


 


Urine Protein/Creatinine Ratio 0.66    


 


Sodium Level 138   135-145  MMOL/L


 


Potassium Level 3.5 L  3.6-5.0  MMOL/L


 


Chloride Level 107     MMOL/L


 


Carbon Dioxide Level 18 L  21-32  MMOL/L


 


Anion Gap 13   5-14  MMOL/L


 


Blood Urea Nitrogen 11   7-18  MG/DL


 


Creatinine


 0.85 


 


 0.60-1.30


MG/DL


 


Estimat Glomerular Filtration


Rate > 60 


 


  





 


BUN/Creatinine Ratio 13    


 


Glucose Level 114 H    MG/DL


 


Uric Acid 6.8   2.6-7.2  MG/DL


 


Calcium Level 8.5   8.5-10.1  MG/DL


 


Corrected Calcium 9.1   8.5-10.1  MG/DL


 


Magnesium Level 1.7   1.6-2.4  MG/DL


 


Total Bilirubin 0.3   0.1-1.0  MG/DL


 


Aspartate Amino Transf


(AST/SGOT) 21 


 


 5-34  U/L





 


Alanine Aminotransferase


(ALT/SGPT) 20 


 


 0-55  U/L





 


Alkaline Phosphatase 94     U/L


 


Total Protein 6.6   6.4-8.2  GM/DL


 


Albumin 3.3   3.2-4.5  GM/DL











OB - Assessment/Plan/Diagnosis


Assessment


Assessment:  induction of labor


Admission Dx


29 yo  @ 37.2 weeks


Preeclampsia


GBS pos


Admission Status:  Inpatient Order (span 2 midnights)


Reason for Inpatient Admission:  


Induction of labor at term





Plan


Plan:  Induction


Induction Method:  per Misoprostol Protocol











KIRILL CUELLAR DO            2020 10:26

## 2020-11-16 NOTE — NUR
FFu/0.  lt rubra noted, no clots expressed. megan-care offered. v-pad and panties in place.

pt transferred to room 309 via w/c with this RN and  @ side.  call light within 
reach.  familiarized with room surroundings.  will cont to monitor.

## 2020-11-16 NOTE — NUR
FFu/0.  moderate rubra noted.  megan-care offered.  reports HA and nausea better.  infant and 
 remains @ side.  will cont to monitor.

## 2020-11-16 NOTE — OB LABOR & DELIVERY RECORD
L&D History


Date of Service


Date of Service:  2020





History


Expected Date of Delivery:  Dec 5, 2020


Gestational Age in Weeks:  37


Hx :  1


Hx Para:  0





Pregnancy Complications


Prenatal Events:  Pre-Eclampsia, Routine Prenatal care


Operative Indications (Cesarea:  N/A-Vaginal Delivery


Intrapartal Events:  None





L&D Stage1


Monitors and Tracing


Fetal Monitor Mode:  Internal


Fetal Heart Rate:  120


Fetal Monitor Accelerations:  Uniform


Fetal Monitor Decelerations:  Variable


Fetal Station:  -2


Long Term Variability:  Average (6-10)


Short Term Variability:  Present


Fetal Presentation:  Vertex


Vital Signs





                          VS - Last 72 Hours, by Label








 11/15/20 11/15/20 11/15/20 11/15/20





 18:55 18:58 20:15 20:57


 


Temp 36.6  36.7 36.7


 


Pulse 111 109 90 90


 


Resp 18  18 18


 


B/P (MAP) 182/128 (146) 160/93 (115) 152/101 (118) 


 


Pulse Ox 98  98 97


 


O2 Delivery Room Air Room Air Room Air Room Air


 


    





 11/15/20 11/15/20 11/15/20 11/15/20





 21:15 22:15 23:15 23:30


 


Pulse 76 78 81 83


 


Resp 18 18 18 18


 


B/P (MAP) 155/102 (119) 162/101 (121) 173/101 (125) 177/109 (131)


 


Pulse Ox 98 98  


 


O2 Delivery Room Air Room Air Room Air Room Air





 11/15/20 11/15/20 11/16/20 11/16/20





 23:45 23:45 00:00 00:00


 


Pulse 94 94 87 87


 


Resp  18  18


 


B/P (MAP) 182/116 (138) 182/116 (138) 150/92 (111) 150/92 (111)


 


Pulse Ox  97  97


 


O2 Delivery  Room Air  Room Air





 20





 00:15 00:30 00:45 01:00


 


Pulse 84 82 89 83


 


Resp    18


 


B/P (MAP) 159/91 (113) 169/95 (119) 158/95 (116) 158/95 (116)


 


Pulse Ox    96


 


O2 Delivery    Room Air





 20





 01:15 01:45 01:45 02:00


 


Pulse 80 109 109 110


 


Resp 18 18 18 18


 


B/P (MAP) 143/84 (103) 192/106 (134) 192/106 (134) 182/96 (124)


 


Pulse Ox   100 


 


O2 Delivery   Non Rebreather 


 


O2 Flow Rate   15.00 





 20





 02:00 02:05 02:15 02:25


 


Temp 36.3   


 


Pulse 110 88 92 96


 


Resp 18 18 18 18


 


B/P (MAP) 182/96 (124) 181/87 (118) 179/95 (123) 188/112 (137)


 


Pulse Ox 99 100 98 97


 


O2 Delivery Non Rebreather Non Rebreather Non Rebreather Non Rebreather


 


O2 Flow Rate 15.00 15.00 15.00 15.00


 


    





 20





 02:30 02:35 03:20 03:30


 


Pulse 98 98 86 88


 


Resp 18 18 18 18


 


B/P (MAP) 176/108 (130) 176/108 (130) 159/104 (122) 153/106 (122)


 


Pulse Ox  97 97 


 


O2 Delivery  Non Rebreather Room Air 


 


O2 Flow Rate  15.00  





 20





 03:35 04:30 04:35 05:05


 


Pulse 88 78 78 79


 


Resp 18 18 18 18


 


B/P (MAP) 153/106 (122) 163/100 (121) 163/100 (121) 148/95 (112)


 


Pulse Ox 97  97 96


 


O2 Delivery Room Air  Room Air Room Air





 20





 05:30 05:35 05:50 06:10


 


Temp  36.5  


 


Pulse 78 90 88 85


 


Resp 18 18 18 18


 


B/P (MAP) 168/107 (127) 168/107 (127) 165/103 (123) 164/89 (114)


 


Pulse Ox  98 97 98


 


O2 Delivery  Room Air Room Air Room Air


 


    





 20





 06:30 06:35 07:05 07:20


 


Pulse 86 86 85 89


 


Resp 18 18 18 18


 


B/P (MAP) 169/111 (130) 169/111 (130) 165/114 (131) 178/114 (135)


 


Pulse Ox  98 98 97


 


O2 Delivery  Room Air Room Air Room Air





 20





 07:30 07:45 07:50 07:55


 


Pulse 85 88 90 91


 


Resp 18 18 18 18


 


B/P (MAP) 165/114 (131) 188/107 (134) 186/101 (129) 174/105 (128)


 


Pulse Ox 98 98 96 96


 


O2 Delivery Room Air Room Air Room Air Room Air





 20





 08:00 08:05 08:10 08:15


 


Temp    36.0


 


Pulse 96 88 82 92


 


Resp 18 18 18 18


 


B/P (MAP) 174/104 (127) 158/102 (120) 168/106 (126) 139/95 (110)


 


Pulse Ox 96 97 98 97


 


O2 Delivery Room Air Room Air Room Air Room Air


 


    





 20





 08:30 08:45 09:00 09:15


 


Pulse 79 79 72 78


 


Resp 18 18 18 18


 


B/P (MAP) 139/95 (110) 152/75 (100) 132/91 (105) 138/101 (113)


 


Pulse Ox 96 96 94 98


 


O2 Delivery Room Air Room Air Room Air Room Air





 20





 09:30 09:45 10:00 10:15


 


Pulse 71 77 89 83


 


Resp 18 18 18 18


 


B/P (MAP) 147/99 (115) 135/92 (106) 149/100 (116) 117/57 (77)


 


Pulse Ox 97 97 98 99


 


O2 Delivery Room Air Room Air Room Air Room Air





 20   





 10:30   


 


Pulse 78   


 


Resp 18   


 


B/P (MAP) 156/99 (118)   


 


Pulse Ox 98   


 


O2 Delivery Room Air   











Rupture of Membranes


Spontaneous Ruture of Membrane:  No


Amniotic Membrane Rupture Time:  715


Amniotic Membrane Fluid Desc.:  Clear


Vaginal Bleeding Description:  Normal Show





Induction/Anesthesia


Epidural Cath Placement - Time:  805





Progress/Notes


Patient admitted for induction of labor due to PreE.  BP control was limited 

overnight as PO and IV labetalol was given to limit BP elevation.  Misoprostol 

given overnight PO, followed by AROM and Pitocin this am.  Epidural received 

this AM after AROM and she rapidly progressed to complete and +2 station at 

which point fetal bradycardia was noted, and patient was prepped for urgent 

delivery





L&D Stage2


Stage Two


Stage II Date:  2020





Monitors and Tracing


Fetal Monitor Mode:  Internal


Fetal Heart Rate:  120


Fetal Monitor Accelerations:  None


Fetal Monitor Decelerations:  Prolonged


Long Term Variability:  Average (6-10)


Short Term Variability:  Present


Fetal Position:  Right Occiput Anterior


Fetal Presentation:  Vertex





Signs of Fetal Distress by FHT


Signs of Distress


Due due fetal bradycardia, and need for urgent delivery, RML episiotomy 

performed, and Kiwi was placed at the flexion point on the midsagital suture 

line.  Suction appled to 50 mmHg with the next push, the head was guided with 

gentle traction and extension over the RML perineum.  Suction is then released.





Cord Descript/Complications


Fetal Cord Vessel Description:  3 Vessels


Complications


nuchal cord reduced x 1





Delivery Type


Infant Delivery Method:  Low Vacuum Extraction


Anterior Shoulder:  Right





Episiotomy/Perineal Laceration


Laceraction(s)/Extensions:  No


Episiotomy Description:  Right Mediolateral


Degree (describe repair)


RML repaired with 3-0 and 2-0 vicryl in usual fashion.





Condition of Infant


Delivery


Notes


live female infant with weight and apgars pending.





Condition of Infant


Condition of Infant:  Living





Resuscitation


Resuscitation:  N/A - Spontaneous Resp





L&D Stage3


Stage Three


Stage III Date:  2020





Pictocin


Pitocin Administration mu/min:  4


Pitocin ml/hr:  4


Pitocin Administration Comment:  


30 mu wide open at delivery of placenta





Placenta Delivery


Placenta Delivery:  Spontaneous





Delivery Summary


Summary


Estimated blood loss (mL):  350


Attending at delivery:


Kirill Cuellar DO





Condition of Delivery


Examined:  Cervix Examined, Uterus Explored


Post Partum Hemorrhage:  No


Condition of Mother


stable


Condition of Infant (s)


stable











KIRILL CUELLAR DO            2020 12:23

## 2020-11-16 NOTE — NUR
was called r/t BP's.  new orders received to hold current dose of Motrin, given 
toradol 30mg IV x1 now.

## 2020-11-16 NOTE — NUR
this RN into bedside.  c/o mild HA after delivery, becoming worse with change of position to 
HF.HA in frontal area, "temple to temple", rates 2 6 on 1-10 pain scale.  vs taken.  BP cuff 
adjusted.  BP retaken.

## 2020-11-16 NOTE — NUR
here for epidural placement. Procedure explained, consent reviewed and signed 
by anesthesia.  Questions answered to patient's satisfaction. Time out taken to verify 
correct patient/procedure. 

0743- Patient up to side of bed, assisted into sitting position.  Betadine prep done x3 and 
sterile drape applied.  

0751-Local done, see anesthesia record.  

0806- Test dose given, see anesthesia record for drug and dosage.  Epidural catheter secured 
in place.  Epidural placement complete. 

0809-  Assisted back into bed, monitors adjusted.  Epidural dosed, see anesthesia record.  
Epidural of Sufenta/Fentanyl @12cc/hr stated per pump.  Patient tolerated procedure well.

## 2020-11-17 VITALS — DIASTOLIC BLOOD PRESSURE: 74 MMHG | SYSTOLIC BLOOD PRESSURE: 130 MMHG

## 2020-11-17 VITALS — DIASTOLIC BLOOD PRESSURE: 98 MMHG | SYSTOLIC BLOOD PRESSURE: 143 MMHG

## 2020-11-17 VITALS — SYSTOLIC BLOOD PRESSURE: 124 MMHG | DIASTOLIC BLOOD PRESSURE: 83 MMHG

## 2020-11-17 VITALS — DIASTOLIC BLOOD PRESSURE: 89 MMHG | SYSTOLIC BLOOD PRESSURE: 128 MMHG

## 2020-11-17 VITALS — DIASTOLIC BLOOD PRESSURE: 93 MMHG | SYSTOLIC BLOOD PRESSURE: 140 MMHG

## 2020-11-17 VITALS — DIASTOLIC BLOOD PRESSURE: 86 MMHG | SYSTOLIC BLOOD PRESSURE: 126 MMHG

## 2020-11-17 VITALS — SYSTOLIC BLOOD PRESSURE: 126 MMHG | DIASTOLIC BLOOD PRESSURE: 83 MMHG

## 2020-11-17 VITALS — SYSTOLIC BLOOD PRESSURE: 121 MMHG | DIASTOLIC BLOOD PRESSURE: 75 MMHG

## 2020-11-17 VITALS — DIASTOLIC BLOOD PRESSURE: 79 MMHG | SYSTOLIC BLOOD PRESSURE: 128 MMHG

## 2020-11-17 VITALS — SYSTOLIC BLOOD PRESSURE: 146 MMHG | DIASTOLIC BLOOD PRESSURE: 95 MMHG

## 2020-11-17 VITALS — SYSTOLIC BLOOD PRESSURE: 146 MMHG | DIASTOLIC BLOOD PRESSURE: 94 MMHG

## 2020-11-17 VITALS — DIASTOLIC BLOOD PRESSURE: 93 MMHG | SYSTOLIC BLOOD PRESSURE: 146 MMHG

## 2020-11-17 VITALS — DIASTOLIC BLOOD PRESSURE: 89 MMHG | SYSTOLIC BLOOD PRESSURE: 136 MMHG

## 2020-11-17 VITALS — SYSTOLIC BLOOD PRESSURE: 138 MMHG | DIASTOLIC BLOOD PRESSURE: 88 MMHG

## 2020-11-17 VITALS — SYSTOLIC BLOOD PRESSURE: 121 MMHG | DIASTOLIC BLOOD PRESSURE: 70 MMHG

## 2020-11-17 VITALS — SYSTOLIC BLOOD PRESSURE: 123 MMHG | DIASTOLIC BLOOD PRESSURE: 63 MMHG

## 2020-11-17 LAB
BASOPHILS # BLD AUTO: 0 10^3/UL (ref 0–0.1)
BASOPHILS NFR BLD AUTO: 0 % (ref 0–10)
EOSINOPHIL # BLD AUTO: 0 10^3/UL (ref 0–0.3)
EOSINOPHIL NFR BLD AUTO: 0 % (ref 0–10)
HCT VFR BLD CALC: 33 % (ref 35–52)
HGB BLD-MCNC: 11.1 G/DL (ref 11.5–16)
LYMPHOCYTES # BLD AUTO: 1.4 10^3/UL (ref 1–4)
LYMPHOCYTES NFR BLD AUTO: 15 % (ref 12–44)
MANUAL DIFFERENTIAL PERFORMED BLD QL: NO
MCH RBC QN AUTO: 29 PG (ref 25–34)
MCHC RBC AUTO-ENTMCNC: 34 G/DL (ref 32–36)
MCV RBC AUTO: 88 FL (ref 80–99)
MONOCYTES # BLD AUTO: 0.8 10^3/UL (ref 0–1)
MONOCYTES NFR BLD AUTO: 8 % (ref 0–12)
NEUTROPHILS # BLD AUTO: 7.3 10^3/UL (ref 1.8–7.8)
NEUTROPHILS NFR BLD AUTO: 77 % (ref 42–75)
PLATELET # BLD: 182 10^3/UL (ref 130–400)
PMV BLD AUTO: 11.9 FL (ref 9–12.2)
WBC # BLD AUTO: 9.5 10^3/UL (ref 4.3–11)

## 2020-11-17 RX ADMIN — MAGNESIUM SULFATE IN WATER SCH MLS/HR: 40 INJECTION, SOLUTION INTRAVENOUS at 04:11

## 2020-11-17 RX ADMIN — IBUPROFEN SCH MG: 600 TABLET ORAL at 08:37

## 2020-11-17 RX ADMIN — Medication SCH ML: at 21:19

## 2020-11-17 RX ADMIN — LABETALOL HCL SCH MG: 200 TABLET, FILM COATED ORAL at 21:18

## 2020-11-17 RX ADMIN — LABETALOL HCL SCH MG: 200 TABLET, FILM COATED ORAL at 12:57

## 2020-11-17 RX ADMIN — IBUPROFEN SCH MG: 600 TABLET ORAL at 14:19

## 2020-11-17 RX ADMIN — LABETALOL HCL SCH MG: 200 TABLET, FILM COATED ORAL at 08:37

## 2020-11-17 RX ADMIN — DOCUSATE SODIUM SCH MG: 100 CAPSULE ORAL at 21:18

## 2020-11-17 RX ADMIN — Medication SCH ML: at 20:30

## 2020-11-17 RX ADMIN — IBUPROFEN SCH MG: 600 TABLET ORAL at 21:18

## 2020-11-17 RX ADMIN — Medication SCH ML: at 06:36

## 2020-11-17 RX ADMIN — IBUPROFEN SCH MG: 600 TABLET ORAL at 01:44

## 2020-11-17 RX ADMIN — DOCUSATE SODIUM SCH MG: 100 CAPSULE ORAL at 08:37

## 2020-11-17 NOTE — ANESTHESIA-REGIONAL POST-OP
Regional


Patient Condition


Mental Status:  Alert, Oriented x3


Circulation:  Same as Pre-Op


Headache:  Absent


Sensation:  Full Recovery


Motor Block:  Absent





Post Op Complications


Complications


None





Follow Up Care/Instructions


Patient Instructions


None needed.





Anesthesia/Patient Condition


Patient is doing well, no complaints, stable vital signs, no apparent adverse 

anesthesia problems.   


No complications reported per nursing.


[place] [facility] [place]  [place]











TORIE SALINAS [facility]           Nov 17, 2020 07:34

## 2020-11-17 NOTE — POSTPARTUM PROGRESS NOTE
Postpartum Note


Postpartum Note


Postpartum Day # 1





Subjective:


Patient is without complaints. Ambulating, voiding. Tolerating a regular diet 

without nausea or vomiting. Normal lochia. Pain is well controlled with oral 

pain medications. 





Objective:








Physical Exam:


General - Alert and oriented, no apparent distress


Abdomen - Soft, appropriately tender to palpation, non-distended, fundus firm at

umbilicus


Extremities - no edema, negative Sayra's bilaterally 








Assessment:


PPD 1 VAVD


Pre eclampsia


Acute blood loss anemia








Plan:


Routine postpartum care.


D/C alba and Magnesium today


Continue PO labetalol


Encourage breast feeding.


Encourage ambulation.


Ferrous sulfate supplementation.


Plan for discharge tomorrow





Vitals - Labs


Vital Signs - I&O





Vital Signs








  Date Time  Temp Pulse Resp B/P (MAP) Pulse Ox O2 Delivery O2 Flow Rate FiO2


 


11/17/20 07:00 36.4 92 18 140/93 (109) 98 Room Air  


 


11/17/20 06:00 36.8 88 18 126/83 (97) 98 Room Air  


 


11/17/20 05:00  81 18 128/89 (102) 97 Room Air  


 


11/17/20 04:00 36.6 97 18 128/79 (95) 98 Room Air  


 


11/17/20 03:00 36.6 98 18 123/63 (83) 98 Room Air  


 


11/17/20 02:00  104 18 146/93 (110) 99 Room Air  


 


11/17/20 01:00 36.2 90 18 138/88 (105) 98 Room Air  


 


11/17/20 00:00  86 18 121/75 (90) 97 Room Air  


 


11/16/20 23:00 37.0 91 18 128/81 (97) 98 Room Air  


 


11/16/20 22:00  98 18 142/93 (109) 97 Room Air  


 


11/16/20 21:00 36.7 88 18 143/79 (100) 99 Room Air  


 


11/16/20 20:00 36.6 98 18 127/81 (96) 97 Room Air  


 


11/16/20 19:00 36.8 91 18 133/83 (100) 97 Room Air  


 


11/16/20 18:50  92 18 144/90 (108)  Room Air  


 


11/16/20 17:10 36.7 95 18 136/89 (105)  Room Air  


 


11/16/20 16:00  80 18 122/73 (89)  Room Air  


 


11/16/20 15:00  92 18 144/92 (109)  Room Air  


 


11/16/20 14:00 35.8 90 18 143/87 (105)  Room Air  


 


11/16/20 13:45  82 18 140/84 (102)  Room Air  


 


11/16/20 13:30  83 18 144/87 (106)  Room Air  


 


11/16/20 13:15  86 18 146/84 (104)  Room Air  


 


11/16/20 13:05  87 18 153/104 (120)  Room Air  


 


11/16/20 13:00  91 18 160/106 (124)  Room Air  


 


11/16/20 12:45  98 18 129/94 (106)  Room Air  


 


11/16/20 12:30  91 18 144/98 (113)  Room Air  


 


11/16/20 12:15  97 18 135/92 (106)  Room Air  


 


11/16/20 12:00  96 18 134/83 (100)  Room Air  


 


11/16/20 11:30 35.2 91 18  100 Non Rebreather 15.00 


 


11/16/20 11:20  92 18 165/115 (132) 100 Non Rebreather 15.00 


 


11/16/20 11:15 35.2 91 18  100 Non Rebreather 15.00 


 


11/16/20 11:09     100 Non Rebreather 15.00 


 


11/16/20 11:00  87 18 227/112 (150) 99 Room Air  


 


11/16/20 10:45  75 18 156/100 (118) 97 Room Air  


 


11/16/20 10:30  78 18 156/99 (118) 98 Room Air  


 


11/16/20 10:15  83 18 117/57 (77) 99 Room Air  


 


11/16/20 10:00  89 18 149/100 (116) 98 Room Air  


 


11/16/20 09:45  77 18 135/92 (106) 97 Room Air  


 


11/16/20 09:30  71 18 147/99 (115) 97 Room Air  


 


11/16/20 09:15  78 18 138/101 (113) 98 Room Air  


 


11/16/20 09:00  72 18 132/91 (105) 94 Room Air  


 


11/16/20 08:45  79 18 152/75 (100) 96 Room Air  


 


11/16/20 08:30  79 18 139/95 (110) 96 Room Air  


 


11/16/20 08:15 36.0 92 18 139/95 (110) 97 Room Air  


 


11/16/20 08:10  82 18 168/106 (126) 98 Room Air  


 


11/16/20 08:05  88 18 158/102 (120) 97 Room Air  


 


11/16/20 08:00  96 18 174/104 (127) 96 Room Air  


 


11/16/20 07:55  91 18 174/105 (128) 96 Room Air  


 


11/16/20 07:50  90 18 186/101 (129) 96 Room Air  


 


11/16/20 07:45  88 18 188/107 (134) 98 Room Air  


 


11/16/20 07:30  85 18 165/114 (131) 98 Room Air  














I & O 


 


 11/17/20





 07:00


 


Intake Total 4507.4 ml


 


Output Total 2575 ml


 


Balance 1932.4 ml











Labs


Laboratory Tests


11/17/20 06:19: 


White Blood Count 9.5, Red Blood Count 3.77L, Hemoglobin 11.1L, Hematocrit 33L, 

Mean Corpuscular Volume 88, Mean Corpuscular Hemoglobin 29, Mean Corpuscular 

Hemoglobin Concent 34, Red Cell Distribution Width 14.4, Platelet Count 182, 

Mean Platelet Volume 11.9, Immature Granulocyte % (Auto) 0, Neutrophils (%) 

(Auto) 77H, Lymphocytes (%) (Auto) 15, Monocytes (%) (Auto) 8, Eosinophils (%) 

(Auto) 0, Basophils (%) (Auto) 0, Neutrophils # (Auto) 7.3, Lymphocytes # (Auto)

1.4, Monocytes # (Auto) 0.8, Eosinophils # (Auto) 0.0, Basophils # (Auto) 0.0, 

Immature Granulocyte # (Auto) 0.0





Microbiology


11/15/20 Urine Culture - Final, Complete


           Gram Pos Mixed Bacterial Mary


           Strep agalactiae Group B











KIRILL CUELLAR DO            Nov 17, 2020 07:24

## 2020-11-18 VITALS — SYSTOLIC BLOOD PRESSURE: 157 MMHG | DIASTOLIC BLOOD PRESSURE: 99 MMHG

## 2020-11-18 VITALS — DIASTOLIC BLOOD PRESSURE: 99 MMHG | SYSTOLIC BLOOD PRESSURE: 157 MMHG

## 2020-11-18 VITALS — SYSTOLIC BLOOD PRESSURE: 142 MMHG | DIASTOLIC BLOOD PRESSURE: 81 MMHG

## 2020-11-18 RX ADMIN — LABETALOL HCL SCH MG: 200 TABLET, FILM COATED ORAL at 08:49

## 2020-11-18 RX ADMIN — DOCUSATE SODIUM SCH MG: 100 CAPSULE ORAL at 08:50

## 2020-11-18 RX ADMIN — IBUPROFEN SCH MG: 600 TABLET ORAL at 08:49

## 2020-11-18 RX ADMIN — LABETALOL HCL SCH MG: 200 TABLET, FILM COATED ORAL at 14:29

## 2020-11-18 RX ADMIN — IBUPROFEN SCH MG: 600 TABLET ORAL at 14:30

## 2020-11-18 RX ADMIN — IBUPROFEN SCH MG: 600 TABLET ORAL at 03:14

## 2020-11-18 NOTE — POSTPARTUM PROGRESS NOTE
Postpartum Note


Postpartum Note


Postpartum Day # 2





Subjective:


Patient is without complaints. Ambulating, voiding. Tolerating a regular diet 

without nausea or vomiting. Normal lochia. Pain is well controlled with oral 

pain medications.





Objective:








Physical Exam:


General - Alert and oriented, no apparent distress


Abdomen - Soft, appropriately tender to palpation, non-distended, fundus firm at

umbilicus


Extremities - no edema, negative Sayra's bilaterally 








Assessment:


PPD 2 VAVD\


PreE- BP stable


Acute blood loss anemia








Plan:


Routine postpartum care.


Encourage breast feeding.


Encourage ambulation.


Ferrous sulfate supplementation.


Plan for discharge today w/ Labetalol 200 TID





Vitals - Labs


Vital Signs - I&O





Vital Signs








  Date Time  Temp Pulse Resp B/P (MAP) Pulse Ox O2 Delivery O2 Flow Rate FiO2


 


11/18/20 03:14 37.1 83 18 142/81 (101) 98 Room Air  


 


11/17/20 21:18 36.8 87 18 146/94 (111) 98 Room Air  


 


11/17/20 17:00 36.6 84 18 130/74 (92) 97 Room Air  


 


11/17/20 13:00 36.6 88 18 126/86 (99) 97 Room Air  


 


11/17/20 11:00  88 18 121/70 (87) 96 Room Air  


 


11/17/20 10:00  84 18 124/83 (97) 98 Room Air  


 


11/17/20 09:00  90 18 146/95 (112) 97 Room Air  


 


11/17/20 08:34 36.6 86 18 136/89 (105) 98 Room Air  


 


11/17/20 08:00  88 18 143/98 (113) 98 Room Air  














I & O 


 


 11/18/20





 06:59


 


Intake Total 4654 ml


 


Output Total 1375 ml


 


Balance 3279 ml











Labs





Microbiology


11/15/20 Urine Culture - Final, Complete


           Gram Pos Mixed Bacterial Mary


           Strep agalactiae Group B











KIRILL CUELLAR DO            Nov 18, 2020 07:59

## 2020-11-18 NOTE — DISCHARGE INST-WOMEN'S SERVICE
Discharge Inst-Women's Serv


Depart Medication/Instructions


New, Converted or Re-Newed RX:  RX on Chart


Final Diagnosis


PPD 2 VAVD, PreE,


Problems Reviewed?:  Yes





Consults/Follow Up


Additional Follow Up:  Yes


Orders/Referrals


Dr. Cuellar in 6 weeks





Activity


Activity:  Activity as Tolerated


Driving Instructions:  No Driving for 1 Week


NO SMOKING:  NO SMOKING


Nothing Inside Vagina:  No Douching, No [place], No Tampons





Diet


Discharge Diet:  No Restrictions


Symptoms to Report to :  Bleeding Excessive, Pain Increased, Fever Over 101 

Degrees F, Vaginal Bleeding Increase, Questions/Concerns


For Any Problems or Questions:  Contact Your Physician











KIRILL CUELLAR DO            Nov 18, 2020 08:03

## 2025-02-21 NOTE — NUR
Discharged to home.  Ambulates self downstairs to private vehicle with belongings in hand.  
Accompanied by s.o. [facility] pharmacy called stating they do not carry the Hydrocortisone Butyrate 0.1 % Cream only in Lotion and in 59 grams and are wondering if it is okay to switch